# Patient Record
Sex: FEMALE | Race: AMERICAN INDIAN OR ALASKA NATIVE
[De-identification: names, ages, dates, MRNs, and addresses within clinical notes are randomized per-mention and may not be internally consistent; named-entity substitution may affect disease eponyms.]

---

## 2019-12-24 ENCOUNTER — HOSPITAL ENCOUNTER (EMERGENCY)
Dept: HOSPITAL 5 - ED | Age: 44
LOS: 1 days | Discharge: HOME | End: 2019-12-25
Payer: MEDICARE

## 2019-12-24 DIAGNOSIS — N12: ICD-10-CM

## 2019-12-24 DIAGNOSIS — I10: ICD-10-CM

## 2019-12-24 DIAGNOSIS — E87.6: Primary | ICD-10-CM

## 2019-12-24 DIAGNOSIS — Z79.899: ICD-10-CM

## 2019-12-24 DIAGNOSIS — Z88.8: ICD-10-CM

## 2019-12-24 LAB
ALBUMIN SERPL-MCNC: 3.3 G/DL (ref 3.9–5)
ALT SERPL-CCNC: 52 UNITS/L (ref 7–56)
BACTERIA #/AREA URNS HPF: (no result) /HPF
BAND NEUTROPHILS # (MANUAL): 0 K/MM3
BILIRUB UR QL STRIP: (no result)
BLOOD UR QL VISUAL: (no result)
BUN SERPL-MCNC: 13 MG/DL (ref 7–17)
BUN/CREAT SERPL: 12 %
CALCIUM SERPL-MCNC: 8.9 MG/DL (ref 8.4–10.2)
GIANT PLATELETS BLD QL SMEAR: (no result)
HCT VFR BLD CALC: 47.2 % (ref 30.3–42.9)
HEMOLYSIS INDEX: 31
HGB BLD-MCNC: 15.8 GM/DL (ref 10.1–14.3)
MCHC RBC AUTO-ENTMCNC: 34 % (ref 30–34)
MCV RBC AUTO: 88 FL (ref 79–97)
MUCOUS THREADS #/AREA URNS HPF: (no result) /HPF
MYELOCYTES # (MANUAL): 0 K/MM3
PH UR STRIP: 6 [PH] (ref 5–7)
PLATELET # BLD: 337 K/MM3 (ref 140–440)
PROMYELOCYTES # (MANUAL): 0 K/MM3
RBC # BLD AUTO: 5.35 M/MM3 (ref 3.65–5.03)
RBC #/AREA URNS HPF: 46 /HPF (ref 0–6)
TOTAL CELLS COUNTED BLD: 100
UROBILINOGEN UR-MCNC: < 2 MG/DL (ref ?–2)
WBC #/AREA URNS HPF: 29 /HPF (ref 0–6)

## 2019-12-24 PROCEDURE — 80053 COMPREHEN METABOLIC PANEL: CPT

## 2019-12-24 PROCEDURE — 81001 URINALYSIS AUTO W/SCOPE: CPT

## 2019-12-24 PROCEDURE — 96366 THER/PROPH/DIAG IV INF ADDON: CPT

## 2019-12-24 PROCEDURE — 96365 THER/PROPH/DIAG IV INF INIT: CPT

## 2019-12-24 PROCEDURE — 74177 CT ABD & PELVIS W/CONTRAST: CPT

## 2019-12-24 PROCEDURE — 84703 CHORIONIC GONADOTROPIN ASSAY: CPT

## 2019-12-24 PROCEDURE — 87076 CULTURE ANAEROBE IDENT EACH: CPT

## 2019-12-24 PROCEDURE — 96367 TX/PROPH/DG ADDL SEQ IV INF: CPT

## 2019-12-24 PROCEDURE — 99284 EMERGENCY DEPT VISIT MOD MDM: CPT

## 2019-12-24 PROCEDURE — 85025 COMPLETE CBC W/AUTO DIFF WBC: CPT

## 2019-12-24 PROCEDURE — 96375 TX/PRO/DX INJ NEW DRUG ADDON: CPT

## 2019-12-24 PROCEDURE — 85007 BL SMEAR W/DIFF WBC COUNT: CPT

## 2019-12-24 PROCEDURE — 83690 ASSAY OF LIPASE: CPT

## 2019-12-24 PROCEDURE — 87186 SC STD MICRODIL/AGAR DIL: CPT

## 2019-12-24 PROCEDURE — 36415 COLL VENOUS BLD VENIPUNCTURE: CPT

## 2019-12-24 PROCEDURE — 87086 URINE CULTURE/COLONY COUNT: CPT

## 2019-12-24 RX ADMIN — POTASSIUM CHLORIDE SCH MLS/HR: 10 INJECTION, SOLUTION INTRAVENOUS at 22:38

## 2019-12-24 RX ADMIN — POTASSIUM CHLORIDE SCH MLS/HR: 10 INJECTION, SOLUTION INTRAVENOUS at 21:22

## 2019-12-24 NOTE — EMERGENCY DEPARTMENT REPORT
Vomiting/Diarrhea





- HPI


Chief Complaint: Nausea/Vomiting/Diarrhea


Stated Complaint: VOMIT/CANT HOLD FOOD DOWN


Time Seen by Provider: 12/24/19 18:03


Duration: 5 days


Severity: severe


Nausea/Vomiting Severity: Severe


Diarrhea Severity: None


Pain Location: Right Sided


Pain Severity: Severe


Symptoms: Yes Able to Tolerate Fluids, No Watery Diarrhea, No Bloody diarrhea, 

No Fever, No Recent Unusual Foods, No Recent Untreated Water, No Recent use of 

Antibiotics, No Family w/ Similar Symptoms, No Contacts w/ Similar Symptoms, No 

Rash, No Hematuria, No Recent URI Symptoms


Other History: This is a 44-year-old -American female that presents to 

the emergency room with nausea, vomiting, and right-sided abdominal pain for 5 

days.  Last menstrual period 12/22/2019.  Patient also reports associated 

symptoms of myalgia, rhinorrhea, hitting dyspnea.  She reports taking Tylenol, 

ibuprofen, and home remedies with minimal improvement of symptoms.  She denies 

other family members with similar symptoms.  She denies diarrhea, fever, chills,

headache, dizziness, or weakness.





ED Review of Systems


ROS: 


Stated complaint: VOMIT/CANT HOLD FOOD DOWN


Other details as noted in HPI





Constitutional: denies: chills, fever


Respiratory: denies: cough, shortness of breath, wheezing


Cardiovascular: denies: chest pain, palpitations


Gastrointestinal: abdominal pain, nausea, vomiting.  denies: diarrhea


Genitourinary: denies: urgency, dysuria, discharge


Musculoskeletal: denies: back pain, joint swelling, arthralgia


Skin: denies: rash, lesions


Neurological: denies: headache, weakness, paresthesias


Psychiatric: denies: anxiety, depression





ED Past Medical Hx





- Past Medical History


Hx Hypertension: Yes





- Surgical History


Past Surgical History?: No





- Social History


Smoking Status: Never Smoker


Substance Use Type: None





- Medications


Home Medications: 


                                Home Medications











 Medication  Instructions  Recorded  Confirmed  Last Taken  Type


 


Ciprofloxacin HCl [Ciprofloxacin 500 mg PO Q12HR #14 tab 12/25/19  Unknown Rx





TAB]     


 


Ondansetron [Zofran Odt] 4 mg PO Q8HR PRN #20 tab.rapdis 12/25/19  Unknown Rx


 


Potassium Chloride [K-Dur] 20 meq PO BID #6 tablet 12/25/19  Unknown Rx














Vomiting Diarrhea Exam





- Exam


General: 


Vital signs noted. No distress. Alert and acting appropriately.





HEENT: Yes Moist Mucous Membranes, No Pharyngeal Erythema, No Pharyngeal 

Exudates, No Rhinorrhea, No Conjuctival Injection, No Frontal Tenderness, No 

Maxillary Tenderness


Neck: No Adenopathy, No Rigidity


Lungs: Yes Clear Lung Sounds, Yes Good Air Exchange, No Wheezes, No Stridor, No 

Cough, No Nasal Flaring, No Retractions, No Use of Accessory Muscles


Heart exam: Regular: Yes, Murmur: No, Tachycardia: No


Abdomen: Tenderness: Yes (right upper quadrant or right lower quadrant), 

Peritoneal Signs: No, Distention: No, Hyperactive Bowel sounds: No


Skin exam: Rash: No, Edema: No, Normal turgor: Yes


Neurologic: 


Alert and oriented, no deficits.








Musculoskeletal: 


Unremarkable.











ED Course


                                   Vital Signs











  12/24/19





  18:03


 


Temperature 99.5 F


 


Pulse Rate 108 H


 


Respiratory 20





Rate 


 


Blood Pressure 142/91


 


O2 Sat by Pulse 96





Oximetry 








                                   Vital Signs











  12/24/19 12/24/19 12/25/19





  18:03 21:00 01:59


 


Temperature 99.5 F  98 F


 


Pulse Rate 108 H  100 H


 


Respiratory 20 17 18





Rate   


 


Blood Pressure 142/91  


 


Blood Pressure   129/83





[Left]   


 


O2 Sat by Pulse 96  98





Oximetry   














ED Medical Decision Making





- Lab Data


Result diagrams: 


                                 12/24/19 18:11





                                 12/24/19 18:11





                                   Lab Results











  12/24/19 12/24/19 12/24/19 Range/Units





  18:11 18:11 18:11 


 


WBC  21.0 H    (4.5-11.0)  K/mm3


 


RBC  5.35 H    (3.65-5.03)  M/mm3


 


Hgb  15.8 H    (10.1-14.3)  gm/dl


 


Hct  47.2 H    (30.3-42.9)  %


 


MCV  88    (79-97)  fl


 


MCH  30    (28-32)  pg


 


MCHC  34    (30-34)  %


 


RDW  14.5    (13.2-15.2)  %


 


Plt Count  337    (140-440)  K/mm3


 


Add Manual Diff  Complete    


 


Total Counted  100    


 


Seg Neuts % (Manual)  87.0 H    (40.0-70.0)  %


 


Band Neutrophils %  0    %


 


Lymphocytes % (Manual)  7.0 L    (13.4-35.0)  %


 


Reactive Lymphs % (Man)  0    %


 


Monocytes % (Manual)  6.0    (0.0-7.3)  %


 


Eosinophils % (Manual)  0    (0.0-4.3)  %


 


Basophils % (Manual)  0    (0.0-1.8)  %


 


Metamyelocytes %  0    %


 


Myelocytes %  0    %


 


Promyelocytes %  0    %


 


Blast Cells %  0    %


 


Nucleated RBC %  Not Reportable    


 


Seg Neutrophils # Man  18.3 H    (1.8-7.7)  K/mm3


 


Band Neutrophils #  0.0    K/mm3


 


Lymphocytes # (Manual)  1.5    (1.2-5.4)  K/mm3


 


Abs React Lymphs (Man)  0.0    K/mm3


 


Monocytes # (Manual)  1.3 H    (0.0-0.8)  K/mm3


 


Eosinophils # (Manual)  0.0    (0.0-0.4)  K/mm3


 


Basophils # (Manual)  0.0    (0.0-0.1)  K/mm3


 


Metamyelocytes #  0.0    K/mm3


 


Myelocytes #  0.0    K/mm3


 


Promyelocytes #  0.0    K/mm3


 


Blast Cells #  0.0    K/mm3


 


WBC Morphology  Not Reportable    


 


Hypersegmented Neuts  Not Reportable    


 


Hyposegmented Neuts  Not Reportable    


 


Hypogranular Neuts  Not Reportable    


 


Smudge Cells  Not Reportable    


 


Toxic Granulation  Not Reportable    


 


Toxic Vacuolation  Not Reportable    


 


Dohle Bodies  Not Reportable    


 


Pelger-Huet Anomaly  Not Reportable    


 


Guille Rods  Not Reportable    


 


Platelet Estimate  Not Reportable    


 


Clumped Platelets  Not Reportable    


 


Plt Clumps, EDTA  Not Reportable    


 


Large Platelets  Not Reportable    


 


Giant Platelets  1+    


 


Platelet Satelliting  Not Reportable    


 


Plt Morphology Comment  Not Reportable    


 


RBC Morphology  Normal    


 


Dimorphic RBCs  Not Reportable    


 


Polychromasia  Not Reportable    


 


Hypochromasia  Not Reportable    


 


Poikilocytosis  Not Reportable    


 


Anisocytosis  Not Reportable    


 


Microcytosis  Not Reportable    


 


Macrocytosis  Not Reportable    


 


Spherocytes  Not Reportable    


 


Pappenheimer Bodies  Not Reportable    


 


Sickle Cells  Not Reportable    


 


Target Cells  Not Reportable    


 


Tear Drop Cells  Not Reportable    


 


Ovalocytes  Not Reportable    


 


Helmet Cells  Not Reportable    


 


Miller-Fosston Bodies  Not Reportable    


 


Cabot Rings  Not Reportable    


 


H[place]urg Cells  Not Reportable    


 


Bite Cells  Not Reportable    


 


Crenated Cell  Not Reportable    


 


Elliptocytes  Not Reportable    


 


Acanthocytes (Spur)  Not Reportable    


 


Rouleaux  Not Reportable    


 


Hemoglobin C Crystals  Not Reportable    


 


Schistocytes  Not Reportable    


 


Malaria parasites  Not Reportable    


 


Adrian Bodies  Not Reportable    


 


Hem Pathologist Commnt  No    


 


Sodium   130 L   (137-145)  mmol/L


 


Potassium   2.8 L*   (3.6-5.0)  mmol/L


 


Chloride   86.1 L   ()  mmol/L


 


Carbon Dioxide   27   (22-30)  mmol/L


 


Anion Gap   20   mmol/L


 


BUN   13   (7-17)  mg/dL


 


Creatinine   1.1   (0.7-1.2)  mg/dL


 


Estimated GFR   53   ml/min


 


BUN/Creatinine Ratio   12   %


 


Glucose   153 H   ()  mg/dL


 


Calcium   8.9   (8.4-10.2)  mg/dL


 


Total Bilirubin   0.50   (0.1-1.2)  mg/dL


 


AST   64 H   (5-40)  units/L


 


ALT   52   (7-56)  units/L


 


Alkaline Phosphatase   116   ()  units/L


 


Total Protein   7.9   (6.3-8.2)  g/dL


 


Albumin   3.3 L   (3.9-5)  g/dL


 


Albumin/Globulin Ratio   0.7   %


 


Lipase   17   (13-60)  units/L


 


HCG, Qual    Negative  (Negative)  


 


Urine Color     (Yellow)  


 


Urine Turbidity     (Clear)  


 


Urine pH     (5.0-7.0)  


 


Ur Specific Gravity     (1.003-1.030)  


 


Urine Protein     (Negative)  mg/dL


 


Urine Glucose (UA)     (Negative)  mg/dL


 


Urine Ketones     (Negative)  mg/dL


 


Urine Blood     (Negative)  


 


Urine Nitrite     (Negative)  


 


Urine Bilirubin     (Negative)  


 


Urine Urobilinogen     (<2.0)  mg/dL


 


Ur Leukocyte Esterase     (Negative)  


 


Urine WBC (Auto)     (0.0-6.0)  /HPF


 


Urine RBC (Auto)     (0.0-6.0)  /HPF


 


U Epithel Cells (Auto)     (0-13.0)  /HPF


 


Urine Bacteria (Auto)     (Negative)  /HPF


 


Urine Mucus     /HPF


 


Urine Yeast (Budding)     /HPF














  12/24/19 Range/Units





  Unknown 


 


WBC   (4.5-11.0)  K/mm3


 


RBC   (3.65-5.03)  M/mm3


 


Hgb   (10.1-14.3)  gm/dl


 


Hct   (30.3-42.9)  %


 


MCV   (79-97)  fl


 


MCH   (28-32)  pg


 


MCHC   (30-34)  %


 


RDW   (13.2-15.2)  %


 


Plt Count   (140-440)  K/mm3


 


Add Manual Diff   


 


Total Counted   


 


Seg Neuts % (Manual)   (40.0-70.0)  %


 


Band Neutrophils %   %


 


Lymphocytes % (Manual)   (13.4-35.0)  %


 


Reactive Lymphs % (Man)   %


 


Monocytes % (Manual)   (0.0-7.3)  %


 


Eosinophils % (Manual)   (0.0-4.3)  %


 


Basophils % (Manual)   (0.0-1.8)  %


 


Metamyelocytes %   %


 


Myelocytes %   %


 


Promyelocytes %   %


 


Blast Cells %   %


 


Nucleated RBC %   


 


Seg Neutrophils # Man   (1.8-7.7)  K/mm3


 


Band Neutrophils #   K/mm3


 


Lymphocytes # (Manual)   (1.2-5.4)  K/mm3


 


Abs React Lymphs (Man)   K/mm3


 


Monocytes # (Manual)   (0.0-0.8)  K/mm3


 


Eosinophils # (Manual)   (0.0-0.4)  K/mm3


 


Basophils # (Manual)   (0.0-0.1)  K/mm3


 


Metamyelocytes #   K/mm3


 


Myelocytes #   K/mm3


 


Promyelocytes #   K/mm3


 


Blast Cells #   K/mm3


 


WBC Morphology   


 


Hypersegmented Neuts   


 


Hyposegmented Neuts   


 


Hypogranular Neuts   


 


Smudge Cells   


 


Toxic Granulation   


 


Toxic Vacuolation   


 


Dohle Bodies   


 


Pelger-Huet Anomaly   


 


Guille Rods   


 


Platelet Estimate   


 


Clumped Platelets   


 


Plt Clumps, EDTA   


 


Large Platelets   


 


Giant Platelets   


 


Platelet Satelliting   


 


Plt Morphology Comment   


 


RBC Morphology   


 


Dimorphic RBCs   


 


Polychromasia   


 


Hypochromasia   


 


Poikilocytosis   


 


Anisocytosis   


 


Microcytosis   


 


Macrocytosis   


 


Spherocytes   


 


Pappenheimer Bodies   


 


Sickle Cells   


 


Target Cells   


 


Tear Drop Cells   


 


Ovalocytes   


 


Helmet Cells   


 


Miller-Fosston Bodies   


 


Cabot Rings   


 


Hamburg Cells   


 


Bite Cells   


 


Crenated Cell   


 


Elliptocytes   


 


Acanthocytes (Spur)   


 


Rouleaux   


 


Hemoglobin C Crystals   


 


Schistocytes   


 


Malaria parasites   


 


Adrian Bodies   


 


Hem Pathologist Commnt   


 


Sodium   (137-145)  mmol/L


 


Potassium   (3.6-5.0)  mmol/L


 


Chloride   ()  mmol/L


 


Carbon Dioxide   (22-30)  mmol/L


 


Anion Gap   mmol/L


 


BUN   (7-17)  mg/dL


 


Creatinine   (0.7-1.2)  mg/dL


 


Estimated GFR   ml/min


 


BUN/Creatinine Ratio   %


 


Glucose   ()  mg/dL


 


Calcium   (8.4-10.2)  mg/dL


 


Total Bilirubin   (0.1-1.2)  mg/dL


 


AST   (5-40)  units/L


 


ALT   (7-56)  units/L


 


Alkaline Phosphatase   ()  units/L


 


Total Protein   (6.3-8.2)  g/dL


 


Albumin   (3.9-5)  g/dL


 


Albumin/Globulin Ratio   %


 


Lipase   (13-60)  units/L


 


HCG, Qual   (Negative)  


 


Urine Color  Yellow  (Yellow)  


 


Urine Turbidity  Cloudy  (Clear)  


 


Urine pH  6.0  (5.0-7.0)  


 


Ur Specific Gravity  1.038 H  (1.003-1.030)  


 


Urine Protein  100 mg/dl  (Negative)  mg/dL


 


Urine Glucose (UA)  Neg  (Negative)  mg/dL


 


Urine Ketones  Neg  (Negative)  mg/dL


 


Urine Blood  Lg  (Negative)  


 


Urine Nitrite  Pos  (Negative)  


 


Urine Bilirubin  Neg  (Negative)  


 


Urine Urobilinogen  < 2.0  (<2.0)  mg/dL


 


Ur Leukocyte Esterase  Sm  (Negative)  


 


Urine WBC (Auto)  29.0 H  (0.0-6.0)  /HPF


 


Urine RBC (Auto)  46.0  (0.0-6.0)  /HPF


 


U Epithel Cells (Auto)  22.0 H  (0-13.0)  /HPF


 


Urine Bacteria (Auto)  1+  (Negative)  /HPF


 


Urine Mucus  Few  /HPF


 


Urine Yeast (Budding)  1+  /HPF














- Radiology Data


Radiology results: report reviewed





CT ABDOMEN AND PELVIS WITH CONTRAST





INDICATION / CLINICAL INFORMATION:


RUQ RLQ tenderness.





TECHNIQUE:


Axial CT images were obtained through the abdomen and pelvis after 100 cc 

Omnipaque 300 milligrams


percent IV contrast. All CT scans at this location are performed using CT dose 

reduction for ALARA


by means of automated exposure control.





COMPARISON:


None available.





FINDINGS:


LOWER CHEST: No significant abnormality.


LIVER: No significant abnormality.


GALLBLADDER: No significant abnormality.


BILE DUCTS: No significant abnormality.


PANCREAS: No significant abnormality.


SPLEEN: No significant abnormality.


ADRENALS: No significant abnormality.


RIGHT KIDNEY and URETER: No significant abnormality.


LEFT KIDNEY and URETER: No significant abnormality.





STOMACH and SMALL BOWEL: No significant abnormality.


COLON: No significant abnormality.


APPENDIX: No significant abnormality.


PERITONEUM: No free fluid. No free air. No fluid collection.


LYMPH NODES: No significant adenopathy.


AORTA and ARTERIES: No significant abnormality.


IVC and VEINS: No significant abnormality.





URINARY BLADDER: No significant abnormality.


REPRODUCTIVE ORGANS: No significant abnormality.





ADDITIONAL FINDINGS: None.





SKELETAL SYSTEM: No significant abnormality.





IMPRESSION:


1. No significant abnormality.





- Medical Decision Making





Patient was examined by me.  Labs and CT of abdomen and pelvis obtained.  Labs 

positive for acute cystitis, leukocytosis.  Right upper quadrant and right lower

 quadrant TTP, negative CVA tenderness on exam.  Given analgesics, normal saline

 1 L bolus, anti-emetics, and antibiotics.  CT of abdomen negative for acute 

disease.  Pyelonephritis.  Patient informed of results.  Vitals are stable.  

Hypokalemia replaced with potassium IV.  Patient stable for outpatient therapy. 

 Start cipro, zofran, and potassium.  Plan discussed with patient to discharge 

home and treat outpatient.  She agrees with ER plan. Patient discharged home in 

stable condition. Follow up with PCP in 2-3 days.


Critical care attestation.: 


If time is entered above; I have spent that time in minutes in the direct care 

of this critically ill patient, excluding procedure time.








ED Disposition


Clinical Impression: 


 Hypokalemia, Pyelonephritis





Abdominal pain


Qualifiers:


 Abdominal location: right upper quadrant Qualified Code(s): R10.11 - Right 

upper quadrant pain





Nausea and vomiting


Qualifiers:


 Vomiting type: unspecified Vomiting Intractability: non-intractable Qualified 

Code(s): R11.2 - Nausea with vomiting, unspecified





Disposition: DC-01 TO HOME OR SELFCARE


Is pt being admited?: No


Condition: Stable


Instructions:  Acute Pyelonephritis (ED)


Additional Instructions: 


Increase water intake to 1 L to 2 L per day.  Complete for course of antibiotics

 as prescribed.  I have provided medication for nausea and vomiting, also 

Zofran.  He will need to take all of the potassium pills prescribed.  Follow up 

with a primary care doctor in 2-3 days or return to the emergency room with 

worsening symptoms.


Prescriptions: 


Ciprofloxacin HCl [Ciprofloxacin TAB] 500 mg PO Q12HR #14 tab


Potassium Chloride [K-Dur] 20 meq PO BID #6 tablet


Ondansetron [Zofran Odt] 4 mg PO Q8HR PRN #20 tab.rapdis


 PRN Reason: Nausea And Vomiting


Referrals: 


[facility] [Outside] - 3-5 Days


[facility] [Outside] - 3-5 Days


[facility] [Outside] - 3-5 Days


Forms:  Work/School Release Form(ED)


Time of Disposition: 02:24

## 2019-12-24 NOTE — CAT SCAN REPORT
CT ABDOMEN AND PELVIS WITH CONTRAST



INDICATION / CLINICAL INFORMATION:

RUQ   RLQ tenderness.



TECHNIQUE:

Axial CT images were obtained through the abdomen and pelvis after 100 cc Omnipaque 300 milligrams pe
rcent IV contrast.  All CT scans at this location are performed using CT dose reduction for ALARA by 
means of automated exposure control. 



COMPARISON:

None available.



FINDINGS:

LOWER CHEST: No significant abnormality.

LIVER: No significant abnormality.

GALLBLADDER: No significant abnormality.  

BILE DUCTS: No significant abnormality.

PANCREAS: No significant abnormality.

SPLEEN: No significant abnormality.

ADRENALS: No significant abnormality.

RIGHT KIDNEY and URETER: No significant abnormality.

LEFT KIDNEY and URETER: No significant abnormality.



STOMACH and SMALL BOWEL: No significant abnormality. 

COLON: No significant abnormality. 

APPENDIX: No significant abnormality.  

PERITONEUM: No free fluid. No free air. No fluid collection.

LYMPH NODES: No significant adenopathy.

AORTA and ARTERIES: No significant abnormality. 

IVC and VEINS: No significant abnormality.



URINARY BLADDER: No significant abnormality.

REPRODUCTIVE ORGANS: No significant abnormality.



ADDITIONAL FINDINGS: None.



SKELETAL SYSTEM: No significant abnormality.



IMPRESSION:

1. No significant abnormality.



Signer Name: Dmitry Smith MD 

Signed: 12/24/2019 10:41 PM

 Workstation Name: [id number]

## 2019-12-24 NOTE — EMERGENCY DEPARTMENT REPORT
Blank Doc





- Documentation


Documentation: 





44-year-old female that presents with abdominal pain with n/v.





This initial assessment/diagnostic orders/clinical plan/treatment(s) is/are 

subject to change based on patient's health status, clinical progression and re-

assessment by fellow clinical providers in the ED.  Further treatment and workup

at subsequent clinical providers discretion.  Patient/guardians urged not to 

elope from the ED as their condition may be serious if not clinically assessed 

and managed.  Initial orders include:


1- Patient sent to [facility] for further evaluation and treatment


2- labs


3- UA

## 2019-12-25 VITALS — SYSTOLIC BLOOD PRESSURE: 129 MMHG | DIASTOLIC BLOOD PRESSURE: 83 MMHG

## 2019-12-25 RX ADMIN — POTASSIUM CHLORIDE SCH MLS/HR: 10 INJECTION, SOLUTION INTRAVENOUS at 00:40

## 2019-12-25 RX ADMIN — POTASSIUM CHLORIDE SCH MLS/HR: 10 INJECTION, SOLUTION INTRAVENOUS at 04:26
